# Patient Record
Sex: MALE | Race: OTHER | HISPANIC OR LATINO | Employment: UNEMPLOYED | URBAN - NONMETROPOLITAN AREA
[De-identification: names, ages, dates, MRNs, and addresses within clinical notes are randomized per-mention and may not be internally consistent; named-entity substitution may affect disease eponyms.]

---

## 2022-07-08 ENCOUNTER — HOSPITAL ENCOUNTER (EMERGENCY)
Facility: HOSPITAL | Age: 32
Discharge: HOME | End: 2022-07-08
Attending: EMERGENCY MEDICINE
Payer: COMMERCIAL

## 2022-07-08 VITALS
OXYGEN SATURATION: 98 % | HEIGHT: 64 IN | TEMPERATURE: 98.2 F | BODY MASS INDEX: 22.2 KG/M2 | RESPIRATION RATE: 16 BRPM | SYSTOLIC BLOOD PRESSURE: 144 MMHG | HEART RATE: 94 BPM | DIASTOLIC BLOOD PRESSURE: 95 MMHG | WEIGHT: 130 LBS

## 2022-07-08 DIAGNOSIS — L23.7 POISON IVY DERMATITIS: Primary | ICD-10-CM

## 2022-07-08 DIAGNOSIS — L03.116 CELLULITIS OF LEFT LOWER LEG: ICD-10-CM

## 2022-07-08 PROCEDURE — 99284 EMERGENCY DEPT VISIT MOD MDM: CPT | Performed by: PHYSICIAN ASSISTANT

## 2022-07-08 PROCEDURE — 99282 EMERGENCY DEPT VISIT SF MDM: CPT

## 2022-07-08 PROCEDURE — 96372 THER/PROPH/DIAG INJ SC/IM: CPT

## 2022-07-08 RX ORDER — DIPHENHYDRAMINE HCL 25 MG
25 TABLET ORAL ONCE
Status: COMPLETED | OUTPATIENT
Start: 2022-07-08 | End: 2022-07-08

## 2022-07-08 RX ORDER — CEPHALEXIN 500 MG/1
500 CAPSULE ORAL EVERY 6 HOURS SCHEDULED
Qty: 28 CAPSULE | Refills: 0 | Status: SHIPPED | OUTPATIENT
Start: 2022-07-08 | End: 2022-07-15

## 2022-07-08 RX ORDER — METHYLPREDNISOLONE SODIUM SUCCINATE 125 MG/2ML
80 INJECTION, POWDER, LYOPHILIZED, FOR SOLUTION INTRAMUSCULAR; INTRAVENOUS ONCE
Status: COMPLETED | OUTPATIENT
Start: 2022-07-08 | End: 2022-07-08

## 2022-07-08 RX ORDER — PREDNISONE 10 MG/1
TABLET ORAL
Qty: 60 TABLET | Refills: 0 | Status: SHIPPED | OUTPATIENT
Start: 2022-07-09 | End: 2022-07-25

## 2022-07-08 RX ORDER — DIPHENHYDRAMINE HCL 25 MG
25 TABLET ORAL EVERY 6 HOURS PRN
Qty: 20 TABLET | Refills: 0 | Status: SHIPPED | OUTPATIENT
Start: 2022-07-08

## 2022-07-08 RX ADMIN — DIPHENHYDRAMINE HYDROCHLORIDE 25 MG: 25 TABLET ORAL at 13:04

## 2022-07-08 RX ADMIN — METHYLPREDNISOLONE SODIUM SUCCINATE 80 MG: 125 INJECTION, POWDER, FOR SOLUTION INTRAMUSCULAR; INTRAVENOUS at 13:04

## 2022-07-08 NOTE — ED PROVIDER NOTES
History  Chief Complaint   Patient presents with    Rash     Patient reports rash that began 11 days ago  B/l legs and arms  Slight rash to abdomen  States it happened after being outside  Reports using cortizone and another unknown cream without relief     Patient is a 70-year-old male with no significant past medical history presents rash for 11 days  Patient reports that he had contact with poison ivy approximately 11 days ago and started with rash shortly after  He notes itching, vesicular rash on his bilateral lower legs and arms  He had been given 5-6 days of steroids, which he completed a couple days ago, with some improvement a rash, but it has worsened and now notes a few spots on his abdomen  Patient indicates the rash is on bilateral forearms, anterior abdomen and bilateral legs  He has not been taking anything else to help alleviate his symptoms and denies any other recent exposure poison ivy, new foods, medications, recent travel, new lotions, creams, detergents  He also had been trying topical cortisone and some other topical cream OTC, with minimal relief of symptoms  Patient indicates he has required a steroid injection in the past for his poison ivy  Patient also notes increased redness on the medial side of his left leg where he had been scratching extensively and noted the area had bled  Patient states he is otherwise in his usual state of health and denies any fevers, chills, diaphoresis, headaches, congestion, cough, facial swelling, stridor, oral swelling, shortness of breath, chest pain, abdominal pain, nausea, vomiting, diarrhea, urinary changes  None       Past Medical History:   Diagnosis Date    Asthma     Psychiatric disorder        Past Surgical History:   Procedure Laterality Date    TONSILLECTOMY         History reviewed  No pertinent family history  I have reviewed and agree with the history as documented      E-Cigarette/Vaping     E-Cigarette/Vaping Substances Social History     Tobacco Use    Smoking status: Former Smoker    Smokeless tobacco: Former User   Substance Use Topics    Alcohol use: Yes     Comment: in rehab- 12 days ago was last drink    Drug use: Never       Review of Systems   Constitutional: Negative for chills, diaphoresis and fever  HENT: Negative for congestion  Respiratory: Negative for cough, shortness of breath, wheezing and stridor  Cardiovascular: Negative for chest pain, palpitations and leg swelling  Gastrointestinal: Negative for abdominal pain, diarrhea, nausea and vomiting  Genitourinary: Negative for difficulty urinating  Skin: Positive for rash  Negative for color change and pallor  Neurological: Negative for dizziness, weakness, light-headedness, numbness and headaches  All other systems reviewed and are negative  Physical Exam  Physical Exam  Vitals and nursing note reviewed  Constitutional:       General: He is awake  He is not in acute distress  Appearance: He is well-developed  He is not ill-appearing, toxic-appearing or diaphoretic  HENT:      Head: Normocephalic and atraumatic  Right Ear: External ear normal       Left Ear: External ear normal       Nose: Nose normal    Eyes:      General: No scleral icterus  Conjunctiva/sclera: Conjunctivae normal       Pupils: Pupils are equal, round, and reactive to light  Neck:      Vascular: No JVD  Trachea: No tracheal deviation  Cardiovascular:      Rate and Rhythm: Normal rate and regular rhythm  Pulses: Normal pulses  Heart sounds: Normal heart sounds  Pulmonary:      Effort: Pulmonary effort is normal       Breath sounds: Normal breath sounds  No decreased breath sounds or wheezing  Abdominal:      General: There is no distension  Musculoskeletal:         General: No deformity  Cervical back: Normal range of motion        Comments: Moving all extremities freely, ambulating without issue   Skin:     General: Skin is warm and dry  Capillary Refill: Capillary refill takes less than 2 seconds  Findings: Erythema and rash present  Comments: Macular papular, vesicular rash on bilateral lower extremities with a few vesicles on the upper legs and periumbilical   Patient also with similar rash bilateral forearms and elbows  Exam consistent with poison ivy contact dermatitis  Patient in that area of swelling and erythema on the left medial calf with warmth noted, consistent with early cellulitis  Neurological:      Mental Status: He is alert and oriented to person, place, and time  GCS: GCS eye subscore is 4  GCS verbal subscore is 5  GCS motor subscore is 6  Psychiatric:         Behavior: Behavior normal  Behavior is cooperative  Vital Signs  ED Triage Vitals [07/08/22 1154]   Temperature Pulse Respirations Blood Pressure SpO2   98 2 °F (36 8 °C) 101 16 166/94 98 %      Temp Source Heart Rate Source Patient Position - Orthostatic VS BP Location FiO2 (%)   Temporal Monitor Sitting Right arm --      Pain Score       No Pain           Vitals:    07/08/22 1154   BP: 166/94   Pulse: 101   Patient Position - Orthostatic VS: Sitting         Visual Acuity      ED Medications  Medications   methylPREDNISolone sodium succinate (Solu-MEDROL) injection 80 mg (80 mg Intramuscular Given 7/8/22 1304)   diphenhydrAMINE (BENADRYL) tablet 25 mg (25 mg Oral Given 7/8/22 1304)       Diagnostic Studies  Results Reviewed     None                 No orders to display              Procedures  Procedures         ED Course                               SBIRT 22yo+    Flowsheet Row Most Recent Value   SBIRT (25 yo +)    In order to provide better care to our patients, we are screening all of our patients for alcohol and drug use  Would it be okay to ask you these screening questions? Yes Filed at: 07/08/2022 1213   Initial Alcohol Screen: US AUDIT-C     1   How often do you have a drink containing alcohol? 0 Filed at: 07/08/2022 1213   2  How many drinks containing alcohol do you have on a typical day you are drinking? 0 Filed at: 07/08/2022 1213   3a  Male UNDER 65: How often do you have five or more drinks on one occasion? 0 Filed at: 07/08/2022 1213   3b  FEMALE Any Age, or MALE 65+: How often do you have 4 or more drinks on one occassion? 0 Filed at: 07/08/2022 1213   Audit-C Score 0 Filed at: 07/08/2022 1213   LARA: How many times in the past year have you    Used an illegal drug or used a prescription medication for non-medical reasons? Never Filed at: 07/08/2022 1213                    MDM  Number of Diagnoses or Management Options  Cellulitis of left lower leg  Poison ivy dermatitis  Diagnosis management comments: Exam consistent with contact dermatitis with poison ivy a possible early cellulitis of the left lower leg  Reviewed medication education, treatment at home  Recommended follow-up with PCP for monitoring of symptoms  The management plan was discussed in detail with the patient at bedside and all questions were answered  Provided both verbal and written instructions  Reviewed red flag symptoms and strict return to ED instructions  Patient notes understanding and agrees to plan  Disposition  Final diagnoses:   Poison ivy dermatitis   Cellulitis of left lower leg     Time reflects when diagnosis was documented in both MDM as applicable and the Disposition within this note     Time User Action Codes Description Comment    7/8/2022 12:44 PM [place] Dials Add [L23 7] Poison ivy dermatitis     7/8/2022 12:44 PM [place] Dials Add [A31 691] Cellulitis of left lower leg       ED Disposition     ED Disposition   Discharge    Condition   Stable    Date/Time   Fri Jul 8, 2022 12:44 PM    15 Jenny Real discharge to home/self care                 Follow-up Information     Follow up With Specialties Details [facility] Emergency Medicine If symptoms worsen [phone]  [facility], [address], [address]    Follow-up with PCP for monitoring of symptoms               Patient's Medications   Discharge Prescriptions    CEPHALEXIN (KEFLEX) 500 MG CAPSULE    Take 1 capsule (500 mg total) by mouth every 6 (six) hours for 7 days       Start Date: 7/8/2022  End Date: 7/15/2022       Order Dose: 500 mg       Quantity: 28 capsule    Refills: 0    DIPHENHYDRAMINE (BENADRYL) 25 MG TABLET    Take 1 tablet (25 mg total) by mouth every 6 (six) hours as needed for itching       Start Date: 7/8/2022  End Date: --       Order Dose: 25 mg       Quantity: 20 tablet    Refills: 0    PREDNISONE 10 MG TABLET    Take 6 tablets (60 mg total) by mouth daily for 3 days, THEN 5 tablets (50 mg total) daily for 3 days, THEN 4 tablets (40 mg total) daily for 3 days, THEN 3 tablets (30 mg total) daily for 3 days, THEN 2 tablets (20 mg total) daily for 2 days, THEN 1 tablet (10 mg total) daily for 2 days  Start Date: 7/9/2022  End Date: 7/25/2022       Order Dose: --       Quantity: 60 tablet    Refills: 0       No discharge procedures on file      PDMP Review     None          ED Provider  Electronically Signed by           Hermelinda Morales PA-C  07/08/22 2469

## 2022-07-08 NOTE — DISCHARGE INSTRUCTIONS
Take prednisone as prescribed  Take Benadryl as prescribed as needed for itching  Take Keflex as prescribed for full 7 days  Try to limit scratching  Keep the area clean, gently wash with soap and water    Follow-up with PCP for monitoring of symptoms  Return to ED if symptoms worsen including increased redness, swelling, pus draining from the rash, fevers, chills